# Patient Record
Sex: MALE | Race: BLACK OR AFRICAN AMERICAN | NOT HISPANIC OR LATINO | Employment: UNEMPLOYED | URBAN - METROPOLITAN AREA
[De-identification: names, ages, dates, MRNs, and addresses within clinical notes are randomized per-mention and may not be internally consistent; named-entity substitution may affect disease eponyms.]

---

## 2019-11-24 ENCOUNTER — HOSPITAL ENCOUNTER (EMERGENCY)
Facility: HOSPITAL | Age: 24
Discharge: HOME | End: 2019-11-24
Attending: EMERGENCY MEDICINE

## 2019-11-24 VITALS
HEART RATE: 95 BPM | RESPIRATION RATE: 20 BRPM | DIASTOLIC BLOOD PRESSURE: 85 MMHG | OXYGEN SATURATION: 98 % | SYSTOLIC BLOOD PRESSURE: 141 MMHG | BODY MASS INDEX: 30.47 KG/M2 | WEIGHT: 194.13 LBS | TEMPERATURE: 98 F | HEIGHT: 67 IN

## 2019-11-24 DIAGNOSIS — K59.00 CONSTIPATION, UNSPECIFIED CONSTIPATION TYPE: ICD-10-CM

## 2019-11-24 DIAGNOSIS — K64.5 THROMBOSED EXTERNAL HEMORRHOID: Primary | ICD-10-CM

## 2019-11-24 PROCEDURE — 99282 EMERGENCY DEPT VISIT SF MDM: CPT | Mod: 25,ER

## 2019-11-24 PROCEDURE — 46040 I&D ISCHIORCT&/PERIRCT ABSC: CPT | Mod: ER

## 2019-11-25 NOTE — DISCHARGE INSTRUCTIONS
Patient is advised to manage his ongoing constipation with increasing fluids and fiber in his diet along with use of stool softeners as needed.  Patient is also advised over the next few days to use warm Epson salt soaks for the perianal region.

## 2019-11-25 NOTE — ED TRIAGE NOTES
Patient states he thinks he has a hemorrhoid.  States straining with BM and states when he wiped it felt swollen

## 2019-11-25 NOTE — ED PROVIDER NOTES
Encounter Date: 11/24/2019       History     Chief Complaint   Patient presents with    Constipation     states trouble having bowel movement. states last bowel movement last night. denies any abd pain. denies any blood w/ bowel movement.      Patient currently presents with concern regarding perianal pain.  This onset abruptly earlier today.  Patient describes recent constipation requiring him to strain with bowel movements.  He now notes that there is a bulge near the anal verge.  This has not occurred previously.  Patient has not attended therapy with over-the-counter medications.        Review of patient's allergies indicates:  No Known Allergies  Past Medical History:   Diagnosis Date    Hypertension      Past Surgical History:   Procedure Laterality Date    EYE SURGERY      as an infant     History reviewed. No pertinent family history.  Social History     Tobacco Use    Smoking status: Smoker, Current Status Unknown    Smokeless tobacco: Never Used   Substance Use Topics    Alcohol use: Yes     Comment: occ    Drug use: Yes     Types: Marijuana     Review of Systems   Constitutional: Negative for chills and fever.   HENT: Negative for congestion.    Respiratory: Negative for chest tightness and shortness of breath.    Cardiovascular: Negative for chest pain and leg swelling.   Gastrointestinal: Positive for constipation. Negative for abdominal pain, blood in stool, diarrhea, nausea and vomiting.   Genitourinary: Negative for dysuria, frequency and urgency.   Skin: Negative for color change and rash.   Allergic/Immunologic: Negative for immunocompromised state.   Neurological: Negative for weakness and numbness.   Hematological: Negative for adenopathy. Does not bruise/bleed easily.   All other systems reviewed and are negative.    Physical Exam     Initial Vitals [11/24/19 2011]   BP Pulse Resp Temp SpO2   (!) 141/85 95 20 98 °F (36.7 °C) 98 %      MAP       --         Physical Exam    Constitutional:  He appears well-developed and well-nourished. He is not diaphoretic. No distress.   HENT:   Head: Normocephalic and atraumatic.   Cardiovascular: Normal rate, regular rhythm, normal heart sounds and intact distal pulses.   Pulmonary/Chest: Breath sounds normal. No respiratory distress.   Genitourinary: Rectal exam shows external hemorrhoid (large, thrombosed external hemorrhoid).         Neurological: He is alert and oriented to person, place, and time.   Skin: Skin is warm and dry.   Psychiatric: He has a normal mood and affect. His behavior is normal.         ED Course   I & D - Incision and Drainage  Date/Time: 11/25/2019 2:26 AM  Performed by: Eduin Swan MD  Authorized by: Eduin Swan MD   Consent Done: Yes  Consent: Verbal consent obtained.  Risks and benefits: risks, benefits and alternatives were discussed  Consent given by: patient  Patient understanding: patient states understanding of the procedure being performed  Type: hematoma  Body area: anogenital  Location details: perirectal  Scalpel size: 11  Incision type: single straight  Complexity: simple  Drainage: purulent and  bloody  Drainage amount: moderate  Wound treatment: wound left open  Complications: No  Patient tolerance: Patient tolerated the procedure well with no immediate complications        Labs Reviewed - No data to display       Imaging Results    None          Medical Decision Making:   ED Management:  All findings were reviewed with the patient/family in detail.  Pain markedly improved following evacuation of clot from the hemorrhoid.  I see no indication of an emergent process beyond that addressed during our encounter but have duly counseled the patient/family regarding the need for prompt follow-up as well as the indications that should prompt immediate return to the emergency room should new or worrisome developments occur.  The patient has additionally been provided with printed information regarding diagnosis as well  as instructions regarding follow up and any medications intended to manage the patient's aforementioned conditions.  The patient/family communicates understanding of all this information and all remaining questions and concerns were addressed at this time.                                       Clinical Impression:       ICD-10-CM ICD-9-CM   1. Thrombosed external hemorrhoid K64.5 455.4   2. Constipation, unspecified constipation type K59.00 564.00                             Eduin Swan MD  11/25/19 0228